# Patient Record
Sex: FEMALE | Race: BLACK OR AFRICAN AMERICAN | Employment: UNEMPLOYED | URBAN - METROPOLITAN AREA
[De-identification: names, ages, dates, MRNs, and addresses within clinical notes are randomized per-mention and may not be internally consistent; named-entity substitution may affect disease eponyms.]

---

## 2023-01-03 ENCOUNTER — HOSPITAL ENCOUNTER (EMERGENCY)
Age: 58
Discharge: HOME | End: 2023-01-03
Attending: EMERGENCY MEDICINE
Payer: MEDICARE

## 2023-01-03 VITALS
OXYGEN SATURATION: 99 % | RESPIRATION RATE: 18 BRPM | TEMPERATURE: 98.6 F | SYSTOLIC BLOOD PRESSURE: 185 MMHG | DIASTOLIC BLOOD PRESSURE: 107 MMHG | HEART RATE: 88 BPM

## 2023-01-03 DIAGNOSIS — M54.31 SCIATICA OF RIGHT SIDE: Primary | ICD-10-CM

## 2023-01-03 PROCEDURE — 6370000000 HC RX 637 (ALT 250 FOR IP): Performed by: EMERGENCY MEDICINE

## 2023-01-03 PROCEDURE — 6360000002 HC RX W HCPCS: Performed by: EMERGENCY MEDICINE

## 2023-01-03 PROCEDURE — 99283 EMERGENCY DEPT VISIT LOW MDM: CPT

## 2023-01-03 RX ORDER — CYCLOBENZAPRINE HCL 10 MG
10 TABLET ORAL ONCE
Status: COMPLETED | OUTPATIENT
Start: 2023-01-03 | End: 2023-01-03

## 2023-01-03 RX ORDER — HYDROCODONE BITARTRATE AND ACETAMINOPHEN 5; 325 MG/1; MG/1
1 TABLET ORAL ONCE
Status: COMPLETED | OUTPATIENT
Start: 2023-01-03 | End: 2023-01-03

## 2023-01-03 RX ORDER — CYCLOBENZAPRINE HCL 10 MG
10 TABLET ORAL 3 TIMES DAILY PRN
Qty: 21 TABLET | Refills: 0 | Status: SHIPPED | OUTPATIENT
Start: 2023-01-03 | End: 2023-01-13

## 2023-01-03 RX ADMIN — HYDROCODONE BITARTRATE AND ACETAMINOPHEN 1 TABLET: 5; 325 TABLET ORAL at 05:20

## 2023-01-03 RX ADMIN — DEXAMETHASONE 10 MG: 4 TABLET ORAL at 05:19

## 2023-01-03 RX ADMIN — CYCLOBENZAPRINE 10 MG: 10 TABLET, FILM COATED ORAL at 05:19

## 2023-01-03 ASSESSMENT — PAIN SCALES - GENERAL
PAINLEVEL_OUTOF10: 8
PAINLEVEL_OUTOF10: 10

## 2023-01-03 ASSESSMENT — ENCOUNTER SYMPTOMS
NAUSEA: 0
CHEST TIGHTNESS: 0
VOMITING: 0
FACIAL SWELLING: 0
PHOTOPHOBIA: 0
EYE PAIN: 0
COLOR CHANGE: 0
TROUBLE SWALLOWING: 0
SHORTNESS OF BREATH: 0
VOICE CHANGE: 0
BACK PAIN: 1
ABDOMINAL PAIN: 0

## 2023-01-03 ASSESSMENT — PAIN DESCRIPTION - DESCRIPTORS: DESCRIPTORS: SHARP;SHOOTING

## 2023-01-03 ASSESSMENT — PAIN DESCRIPTION - PAIN TYPE: TYPE: ACUTE PAIN

## 2023-01-03 ASSESSMENT — PAIN DESCRIPTION - LOCATION: LOCATION: BACK

## 2023-01-03 ASSESSMENT — PAIN - FUNCTIONAL ASSESSMENT: PAIN_FUNCTIONAL_ASSESSMENT: 0-10

## 2023-01-03 NOTE — ED NOTES
Pt arrived to ED with c/o back pain. Pt has hx of sciatica. Pt states it came back approximately a week ago and has worsened.       Walter Bean RN  01/03/23 0258

## 2023-01-03 NOTE — ED PROVIDER NOTES
EMERGENCY DEPARTMENT ENCOUNTER    Pt Name: Brenna Ulloa  MRN: [id number]  [place] 1965  Date of evaluation: 1/3/23  CHIEF COMPLAINT       Chief Complaint   Patient presents with    Back Pain     Sciatica pain/ no relief with home meds     HISTORY OF PRESENT ILLNESS   65-year-old female presenting to the ER complaining of right lower back pain. Patient has a history of sciatica in the past and was operated on for it. Patient states it returned about a week ago and it has progressively gotten worse. Patient denies any loss of control of bowel or urine or any numbness in the pelvic area. The history is provided by the patient. Back Pain  Location:  Lumbar spine  Quality:  Aching  Radiates to:  R thigh  Pain severity:  Moderate  Onset quality:  Gradual  Duration:  1 week  Timing:  Constant  Progression:  Worsening  Chronicity:  New  Associated symptoms: no abdominal pain, no chest pain, no dysuria and no headaches          REVIEW OF SYSTEMS     Review of Systems   Constitutional:  Negative for activity change, appetite change and fatigue. HENT:  Negative for facial swelling, trouble swallowing and voice change. Eyes:  Negative for photophobia and pain. Respiratory:  Negative for chest tightness and shortness of breath. Cardiovascular:  Negative for chest pain and palpitations. Gastrointestinal:  Negative for abdominal pain, nausea and vomiting. Genitourinary:  Negative for dysuria and urgency. Musculoskeletal:  Positive for back pain. Negative for arthralgias. Skin:  Negative for color change and rash. Neurological:  Negative for dizziness, syncope and headaches. Psychiatric/Behavioral:  Negative for behavioral problems and hallucinations.     PASTMEDICAL HISTORY     Past Medical History:   Diagnosis Date    Arthritis      Past Problem List  Patient Active Problem List   Diagnosis Code    Osteoarthritis of left shoulder M19.012    [facility] F31.9     SURGICAL HISTORY Past Surgical History:   Procedure Laterality Date    COLONOSCOPY      REPLACEMENT SHOULDER TOTAL Left 2019    LEFT SHOULDER TOTAL ARTHROPLASTY performed by Lilly Rogers MD at [address] [facility]       Previous Medications    ASPIRIN EC 81 MG EC TABLET    Take 1 tablet by mouth 2 times daily for 14 days    CLONIDINE (CATAPRES) 0.1 MG TABLET    Take 1 tablet by mouth nightly    DIVALPROEX (DEPAKOTE ER) 250 MG EXTENDED RELEASE TABLET    Take 1 tablet by mouth 2 times daily for 14 days    DOCUSATE SODIUM (COLACE) 100 MG CAPSULE    Take 1 capsule by mouth 2 times daily    ESCITALOPRAM (LEXAPRO) 10 MG TABLET    Take 10 mg by mouth daily    MULTIPLE VITAMINS-IRON (MULTI-VITAMIN/IRON) TABS    Take 1 tablet by mouth daily    QUETIAPINE (SEROQUEL) 25 MG TABLET    Take 1 tablet by mouth nightly    TRAZODONE (DESYREL) 50 MG TABLET    Take 1 tablet by mouth nightly as needed for Sleep    VITAMIN D (CHOLECALCIFEROL) 25 MCG (1000 UT) TABS TABLET    Take 50,000 Units by mouth once a week      ALLERGIES     has No Known Allergies. FAMILY HISTORY     She indicated that her mother is alive. She indicated that her father is . She indicated that the status of her brother is unknown. She indicated that the status of her paternal grandmother is unknown. She indicated that the status of her paternal grandfather is unknown. She indicated that the status of her neg hx is unknown.      SOCIAL HISTORY       Social History     Tobacco Use    Smoking status: Former     Types: Cigarettes     Quit date: 2019     Years since quitting: 3.1    Smokeless tobacco: Never   Substance Use Topics    Alcohol use: Yes     Comment: socially    Drug use: Yes     Frequency: 2.0 times per week     Types: Marijuana (Weed)     PHYSICAL EXAM     INITIAL VITALS: BP (!) 185/107 Comment: pt currently bent over the side of the bed  Pulse 88   Temp 98.6 °F (37 °C) (Oral)   Resp 18   SpO2 99%    Physical Exam  Vitals reviewed. Constitutional:       General: She is in acute distress (secondary to pain). Appearance: Normal appearance. She is not ill-appearing or toxic-appearing. HENT:      Head: Normocephalic and atraumatic. Right Ear: External ear normal.      Left Ear: External ear normal.      Nose: No congestion or rhinorrhea. Eyes:      Extraocular Movements: Extraocular movements intact. Pupils: Pupils are equal, round, and reactive to light. Cardiovascular:      Rate and Rhythm: Normal rate and regular rhythm. Pulses: Normal pulses. Heart sounds: Normal heart sounds. Pulmonary:      Effort: Pulmonary effort is normal. No respiratory distress. Breath sounds: Normal breath sounds. No wheezing. Abdominal:      General: Bowel sounds are normal. There is no distension. Palpations: Abdomen is soft. Tenderness: There is no abdominal tenderness. Musculoskeletal:         General: No deformity or signs of injury. Normal range of motion. Cervical back: No rigidity or tenderness. Skin:     General: Skin is warm and dry. Neurological:      Mental Status: She is alert and oriented to person, place, and time. Mental status is at baseline. Psychiatric:         Mood and Affect: Mood normal.         Behavior: Behavior normal.       MEDICAL DECISION MAKING / ED COURSE:   Summary of Patient Presentation: Patient presenting with right lower back pain. 1)  Number and Complexity of Problems Addressed at this Encounter  Problem List This Visit: Sciatica, right lower back pain    Differential Diagnosis: Muscle strain, sciatica          3)  Treatment and Disposition         Patient provided with a dose of Norco, Flexeril as well as Decadron in the ER. Patient provided with a prescription for Flexeril for outpatient use.   Patient instructed to follow-up with the primary care physician within 2 days and to return to the ER immediately if symptoms worsen or change. Patient instructed to follow-up with the surgeon who operated on her back before. Patient understands and agrees with the plan. CRITICAL CARE:       PROCEDURES:    Procedures      DATA FOR LAB AND RADIOLOGY TESTS ORDERED BELOW ARE REVIEWED BY THE ED CLINICIAN:    RADIOLOGY: All x-rays, CT, MRI, and formal ultrasound images (except ED bedside ultrasound) are read by the radiologist, see reports below, unless otherwise noted in MDM or here. Reports below are reviewed by myself. No orders to display       LABS: Lab orders shown below, the results are reviewed by myself, and all abnormals are listed below. Labs Reviewed - No data to display    Vitals Reviewed:    Vitals:    01/03/23 0537   BP: (!) 185/107   Pulse: 88   Resp: 18   Temp: 98.6 °F (37 °C)   TempSrc: Oral   SpO2: 99%     MEDICATIONS GIVEN TO PATIENT THIS ENCOUNTER:  Orders Placed This Encounter   Medications    HYDROcodone-acetaminophen (NORCO) 5-325 MG per tablet 1 tablet    cyclobenzaprine (FLEXERIL) tablet 10 mg    dexamethasone (DECADRON) tablet 10 mg    cyclobenzaprine (FLEXERIL) 10 MG tablet     Sig: Take 1 tablet by mouth 3 times daily as needed for Muscle spasms     Dispense:  21 tablet     Refill:  0     DISCHARGE PRESCRIPTIONS:  New Prescriptions    CYCLOBENZAPRINE (FLEXERIL) 10 MG TABLET    Take 1 tablet by mouth 3 times daily as needed for Muscle spasms     PHYSICIAN CONSULTS ORDERED THIS ENCOUNTER:  None  FINAL IMPRESSION      1.  Sciatica of right side          DISPOSITION/PLAN   DISPOSITION Decision To Discharge 01/03/2023 05:38:08 AM      OUTPATIENT FOLLOW UP THE PATIENT:  Didier Couch, [address]  [phone]  [phone]    Schedule an appointment as soon as possible for a visit in 2 days      Surgeon    Schedule an appointment as soon as possible for a visit in 2 days      [facility] ED  [address] [facility] [address] [phone]  Go to   As needed, If symptoms worsen    DO Liu Lara DO  01/03/23 7708

## 2024-07-04 ENCOUNTER — APPOINTMENT (OUTPATIENT)
Dept: CT IMAGING | Age: 59
End: 2024-07-04
Payer: COMMERCIAL

## 2024-07-04 ENCOUNTER — APPOINTMENT (OUTPATIENT)
Dept: GENERAL RADIOLOGY | Age: 59
End: 2024-07-04
Payer: COMMERCIAL

## 2024-07-04 ENCOUNTER — HOSPITAL ENCOUNTER (EMERGENCY)
Age: 59
Discharge: ANOTHER HOSPITAL | End: 2024-07-05
Attending: EMERGENCY MEDICINE
Payer: COMMERCIAL

## 2024-07-04 DIAGNOSIS — J98.2 PNEUMOMEDIASTINUM (HCC): ICD-10-CM

## 2024-07-04 DIAGNOSIS — S22.32XA CLOSED FRACTURE OF ONE RIB OF LEFT SIDE, INITIAL ENCOUNTER: Primary | ICD-10-CM

## 2024-07-04 DIAGNOSIS — S27.0XXA TRAUMATIC PNEUMOTHORAX, INITIAL ENCOUNTER: ICD-10-CM

## 2024-07-04 LAB
ANION GAP SERPL CALCULATED.3IONS-SCNC: 16 MMOL/L (ref 9–17)
BASOPHILS # BLD: 0.06 K/UL (ref 0–0.2)
BASOPHILS NFR BLD: 1 % (ref 0–2)
BUN SERPL-MCNC: 12 MG/DL (ref 6–20)
BUN/CREAT SERPL: 15 (ref 9–20)
CALCIUM SERPL-MCNC: 9.7 MG/DL (ref 8.6–10.4)
CHLORIDE SERPL-SCNC: 99 MMOL/L (ref 98–107)
CO2 SERPL-SCNC: 22 MMOL/L (ref 20–31)
CREAT SERPL-MCNC: 0.8 MG/DL (ref 0.5–0.9)
EOSINOPHIL # BLD: 0.04 K/UL (ref 0–0.44)
EOSINOPHILS RELATIVE PERCENT: 0 % (ref 1–4)
ERYTHROCYTE [DISTWIDTH] IN BLOOD BY AUTOMATED COUNT: 14.9 % (ref 11.8–14.4)
GFR, ESTIMATED: 85 ML/MIN/1.73M2
GLUCOSE SERPL-MCNC: 130 MG/DL (ref 70–99)
HCT VFR BLD AUTO: 40 % (ref 36.3–47.1)
HGB BLD-MCNC: 13.1 G/DL (ref 11.9–15.1)
IMM GRANULOCYTES # BLD AUTO: 0.04 K/UL (ref 0–0.3)
IMM GRANULOCYTES NFR BLD: 0 %
INR PPP: 0.9
LYMPHOCYTES NFR BLD: 2.79 K/UL (ref 1.1–3.7)
LYMPHOCYTES RELATIVE PERCENT: 24 % (ref 24–43)
MCH RBC QN AUTO: 28.5 PG (ref 25.2–33.5)
MCHC RBC AUTO-ENTMCNC: 32.8 G/DL (ref 28.4–34.8)
MCV RBC AUTO: 87 FL (ref 82.6–102.9)
MONOCYTES NFR BLD: 0.31 K/UL (ref 0.1–1.2)
MONOCYTES NFR BLD: 3 % (ref 3–12)
NEUTROPHILS NFR BLD: 72 % (ref 36–65)
NEUTS SEG NFR BLD: 8.42 K/UL (ref 1.5–8.1)
NRBC BLD-RTO: 0 PER 100 WBC
PARTIAL THROMBOPLASTIN TIME: 32.5 SEC (ref 23.9–33.8)
PLATELET # BLD AUTO: 351 K/UL (ref 138–453)
PMV BLD AUTO: 8.8 FL (ref 8.1–13.5)
POTASSIUM SERPL-SCNC: 3.6 MMOL/L (ref 3.7–5.3)
PROTHROMBIN TIME: 12.6 SEC (ref 11.5–14.2)
RBC # BLD AUTO: 4.6 M/UL (ref 3.95–5.11)
RBC # BLD: ABNORMAL 10*6/UL
SODIUM SERPL-SCNC: 137 MMOL/L (ref 135–144)
WBC OTHER # BLD: 11.7 K/UL (ref 3.5–11.3)

## 2024-07-04 PROCEDURE — 99285 EMERGENCY DEPT VISIT HI MDM: CPT

## 2024-07-04 PROCEDURE — 72131 CT LUMBAR SPINE W/O DYE: CPT

## 2024-07-04 PROCEDURE — 85730 THROMBOPLASTIN TIME PARTIAL: CPT

## 2024-07-04 PROCEDURE — 85025 COMPLETE CBC W/AUTO DIFF WBC: CPT

## 2024-07-04 PROCEDURE — 80048 BASIC METABOLIC PNL TOTAL CA: CPT

## 2024-07-04 PROCEDURE — 6360000002 HC RX W HCPCS: Performed by: EMERGENCY MEDICINE

## 2024-07-04 PROCEDURE — 2580000003 HC RX 258

## 2024-07-04 PROCEDURE — 71260 CT THORAX DX C+: CPT

## 2024-07-04 PROCEDURE — 96374 THER/PROPH/DIAG INJ IV PUSH: CPT

## 2024-07-04 PROCEDURE — 36415 COLL VENOUS BLD VENIPUNCTURE: CPT

## 2024-07-04 PROCEDURE — 85610 PROTHROMBIN TIME: CPT

## 2024-07-04 PROCEDURE — 6360000004 HC RX CONTRAST MEDICATION

## 2024-07-04 PROCEDURE — 70491 CT SOFT TISSUE NECK W/DYE: CPT

## 2024-07-04 PROCEDURE — 96372 THER/PROPH/DIAG INJ SC/IM: CPT

## 2024-07-04 PROCEDURE — 72128 CT CHEST SPINE W/O DYE: CPT

## 2024-07-04 PROCEDURE — 74176 CT ABD & PELVIS W/O CONTRAST: CPT

## 2024-07-04 PROCEDURE — 6360000002 HC RX W HCPCS: Performed by: STUDENT IN AN ORGANIZED HEALTH CARE EDUCATION/TRAINING PROGRAM

## 2024-07-04 PROCEDURE — 6370000000 HC RX 637 (ALT 250 FOR IP): Performed by: STUDENT IN AN ORGANIZED HEALTH CARE EDUCATION/TRAINING PROGRAM

## 2024-07-04 PROCEDURE — 71101 X-RAY EXAM UNILAT RIBS/CHEST: CPT

## 2024-07-04 RX ORDER — MORPHINE SULFATE 2 MG/ML
2 INJECTION, SOLUTION INTRAMUSCULAR; INTRAVENOUS ONCE
Status: COMPLETED | OUTPATIENT
Start: 2024-07-04 | End: 2024-07-04

## 2024-07-04 RX ORDER — 0.9 % SODIUM CHLORIDE 0.9 %
80 INTRAVENOUS SOLUTION INTRAVENOUS ONCE
Status: COMPLETED | OUTPATIENT
Start: 2024-07-04 | End: 2024-07-04

## 2024-07-04 RX ORDER — KETOROLAC TROMETHAMINE 15 MG/ML
15 INJECTION, SOLUTION INTRAMUSCULAR; INTRAVENOUS ONCE
Status: COMPLETED | OUTPATIENT
Start: 2024-07-04 | End: 2024-07-04

## 2024-07-04 RX ORDER — HYDROCODONE BITARTRATE AND ACETAMINOPHEN 5; 325 MG/1; MG/1
2 TABLET ORAL ONCE
Status: DISCONTINUED | OUTPATIENT
Start: 2024-07-04 | End: 2024-07-04

## 2024-07-04 RX ORDER — LIDOCAINE 4 G/G
1 PATCH TOPICAL ONCE
Status: DISCONTINUED | OUTPATIENT
Start: 2024-07-04 | End: 2024-07-05 | Stop reason: HOSPADM

## 2024-07-04 RX ORDER — HYDROCODONE BITARTRATE AND ACETAMINOPHEN 5; 325 MG/1; MG/1
1 TABLET ORAL ONCE
Status: DISCONTINUED | OUTPATIENT
Start: 2024-07-04 | End: 2024-07-04

## 2024-07-04 RX ORDER — SODIUM CHLORIDE 0.9 % (FLUSH) 0.9 %
10 SYRINGE (ML) INJECTION PRN
Status: DISCONTINUED | OUTPATIENT
Start: 2024-07-04 | End: 2024-07-05 | Stop reason: HOSPADM

## 2024-07-04 RX ORDER — ONDANSETRON 2 MG/ML
4 INJECTION INTRAMUSCULAR; INTRAVENOUS ONCE
Status: COMPLETED | OUTPATIENT
Start: 2024-07-04 | End: 2024-07-04

## 2024-07-04 RX ADMIN — MORPHINE SULFATE 2 MG: 2 INJECTION, SOLUTION INTRAMUSCULAR; INTRAVENOUS at 19:23

## 2024-07-04 RX ADMIN — ONDANSETRON 4 MG: 2 INJECTION INTRAMUSCULAR; INTRAVENOUS at 20:28

## 2024-07-04 RX ADMIN — SODIUM CHLORIDE, PRESERVATIVE FREE 10 ML: 5 INJECTION INTRAVENOUS at 21:39

## 2024-07-04 RX ADMIN — SODIUM CHLORIDE 80 ML: 0.9 INJECTION, SOLUTION INTRAVENOUS at 21:39

## 2024-07-04 RX ADMIN — IOPAMIDOL 75 ML: 755 INJECTION, SOLUTION INTRAVENOUS at 21:39

## 2024-07-04 RX ADMIN — KETOROLAC TROMETHAMINE 15 MG: 15 INJECTION, SOLUTION INTRAMUSCULAR; INTRAVENOUS at 22:49

## 2024-07-04 RX ADMIN — HYDROMORPHONE HYDROCHLORIDE 0.5 MG: 1 INJECTION, SOLUTION INTRAMUSCULAR; INTRAVENOUS; SUBCUTANEOUS at 20:30

## 2024-07-04 ASSESSMENT — PAIN DESCRIPTION - ORIENTATION: ORIENTATION: LEFT

## 2024-07-04 ASSESSMENT — PAIN DESCRIPTION - DESCRIPTORS: DESCRIPTORS: ACHING

## 2024-07-04 ASSESSMENT — PAIN DESCRIPTION - LOCATION: LOCATION: RIB CAGE

## 2024-07-04 ASSESSMENT — ENCOUNTER SYMPTOMS
NAUSEA: 0
ABDOMINAL PAIN: 0
CONSTIPATION: 0
WHEEZING: 0
VOMITING: 0
BACK PAIN: 1
DIARRHEA: 0
SHORTNESS OF BREATH: 0

## 2024-07-04 ASSESSMENT — PAIN DESCRIPTION - FREQUENCY: FREQUENCY: CONTINUOUS

## 2024-07-04 ASSESSMENT — PAIN SCALES - GENERAL
PAINLEVEL_OUTOF10: 10
PAINLEVEL_OUTOF10: 10
PAINLEVEL_OUTOF10: 5
PAINLEVEL_OUTOF10: 10

## 2024-07-04 ASSESSMENT — PAIN - FUNCTIONAL ASSESSMENT: PAIN_FUNCTIONAL_ASSESSMENT: 0-10

## 2024-07-04 NOTE — ED PROVIDER NOTES
EMERGENCY DEPARTMENT ENCOUNTER   ATTENDING ATTESTATION     Pt Name: Otilia Solo  MRN: [id number]  Birthdate 1965  Date of evaluation: 7/4/24       Otilia Solo is a 58 y.o. female who presents with Rib Pain (Left fell 15 min ago)      MDM:   The patient is a 58-year-old female who presented to the emergency department secondary to pain.  Patient was undergone section in a car subsequently fell landing on her left side of her chest.  XR ordered a chest x-ray with left rib series patient received Norco, patient will be reevaluated.  Fracture of the left lateral eighth rib no pneumothorax however subcutaneous air in the neck.  Given his orders for CT chest and neck with contrast.  Orders for labs.  Imaging pending at this time, patient will be endorsed to Dr. Dominguez please see his note for final ED disposition      Vitals:   Vitals:    07/04/24 1816 07/04/24 1818 07/04/24 2052   BP:  (!) 170/108 (!) 153/74   Pulse: 60  60   Resp: 24  16   Temp: 98.3 °F (36.8 °C)     TempSrc: Oral     SpO2: 99%  94%   Weight: 78 kg (172 lb)     Height: 1.6 m (5' 3\")           I personally saw and examined the patient. I have reviewed and agree with the resident's findings, including all diagnostic interpretations and treatment plan as written. I was present for the key portions of any procedures performed and the inclusive time noted for any critical care statement.    April Haddad MD  Attending Emergency Physician            April Haddad MD  07/04/24 7520

## 2024-07-04 NOTE — ED PROVIDER NOTES
[facility] ED  EMERGENCY DEPARTMENT ENCOUNTER      Pt Name: Otilia Solo  MRN: [id number]  Birthdate 1965  Date of evaluation: 7/4/2024  Provider: Che Gonzales MD    CHIEF COMPLAINT       Chief Complaint   Patient presents with    Rib Pain     Left fell 15 min ago         HISTORY OF PRESENT ILLNESS   (Location/Symptom, Timing/Onset, Context/Setting, Quality, Duration, Modifying Factors, Severity)  Note limiting factors.   Otilia Solo is a 58 y.o. female who presents to the emergency department with left rib pain s/p fall an hour before presentation    Patient with past medical history hypertension, bipolar 1 disorder presenting with left-sided rib pain status post fall an hour ago.  Patient accompanied with partner stating that she fell and hit her left lateral chest wall against the bathtub.  Patient appears in distress and endorsing severe pain, causing difficulty taking deep breaths.  Patient denies midsternal chest pain, nausea, vomiting, dizziness, headache.  Vitals show elevated blood pressure 170/108 and tachypnea.           Nursing Notes were reviewed.    REVIEW OF SYSTEMS    (2-9 systems for level 4, 10 or more for level 5)     Review of Systems   Constitutional:  Negative for fatigue.   Eyes:  Negative for visual disturbance.   Respiratory:  Negative for shortness of breath and wheezing.    Cardiovascular:  Positive for chest pain (left side). Negative for palpitations.   Gastrointestinal:  Negative for abdominal pain, constipation, diarrhea, nausea and vomiting.   Endocrine: Negative.    Musculoskeletal:  Positive for back pain (left thoracic).   Skin:  Negative for rash.   Neurological:  Negative for dizziness, light-headedness and headaches.   Psychiatric/Behavioral: Negative.         Except as noted above the remainder of the review of systems was reviewed and negative.       PAST MEDICAL HISTORY     Past Medical History:   Diagnosis Date    Arthritis          SURGICAL HISTORY    probability of clinically significant/life threatening deterioration in the patient's condition which required my urgent intervention.      CONSULTS:  None    PROCEDURES:  Unless otherwise noted below, none     Procedures        FINAL IMPRESSION      1. Closed fracture of one rib of left side, initial encounter    2. Subcutaneous emphysema, initial encounter ([facility])          DISPOSITION/PLAN   DISPOSITION  07/04/2024 07:51:16 PM      PATIENT REFERRED TO:  No follow-up provider specified.    DISCHARGE MEDICATIONS:  New Prescriptions    No medications on file     Controlled Substances Monitoring:          No data to display                (Please note that portions of this note were completed with a voice recognition program.  Efforts were made to edit the dictations but occasionally words are mis-transcribed.)    Che Gonzales MD (electronically signed)  Family Medicine Resident, PGY-3

## 2024-07-05 ENCOUNTER — APPOINTMENT (OUTPATIENT)
Dept: GENERAL RADIOLOGY | Age: 59
End: 2024-07-05
Payer: COMMERCIAL

## 2024-07-05 VITALS
SYSTOLIC BLOOD PRESSURE: 102 MMHG | HEIGHT: 63 IN | TEMPERATURE: 98.3 F | RESPIRATION RATE: 16 BRPM | DIASTOLIC BLOOD PRESSURE: 59 MMHG | WEIGHT: 172 LBS | HEART RATE: 60 BPM | OXYGEN SATURATION: 94 % | BODY MASS INDEX: 30.48 KG/M2

## 2024-07-05 NOTE — ED NOTES
Report given to [facility] EMTs present to transport patient to [facility] ED. Patient in stable condition on transport to [facility].

## 2024-07-05 NOTE — ED NOTES
RN attempted to do incentive spirometer with patient. Patient was able to get to 1500 x2 with much discomfort and pain. Dr De Dios updated

## 2024-07-05 NOTE — ED PROVIDER NOTES
[facility] ED  Emergency Department  Emergency Medicine     Care of Otilia Solo was assumed from previous provider and is being seen for Rib Pain (Left fell 15 min ago)  .  The patient's initial evaluation and plan have been discussed with the prior provider who initially evaluated the patient.     EMERGENCY DEPARTMENT COURSE / MEDICAL DECISION MAKING:       MEDICATIONS GIVEN:  Orders Placed This Encounter   Medications    DISCONTD: HYDROcodone-acetaminophen (NORCO) 5-325 MG per tablet 1 tablet    DISCONTD: HYDROcodone-acetaminophen (NORCO) 5-325 MG per tablet 2 tablet    morphine injection 2 mg    HYDROmorphone (DILAUDID) injection 0.5 mg    ondansetron (ZOFRAN) injection 4 mg    sodium chloride 0.9 % bolus 80 mL    sodium chloride flush 0.9 % injection 10 mL    iopamidol (ISOVUE-370) 76 % injection 75 mL    ketorolac (TORADOL) injection 15 mg    lidocaine 4 % external patch 1 patch       LABS / RADIOLOGY:     Labs Reviewed   BASIC METABOLIC PANEL - Abnormal; Notable for the following components:       Result Value    Potassium 3.6 (*)     Glucose 130 (*)     All other components within normal limits   CBC WITH AUTO DIFFERENTIAL - Abnormal; Notable for the following components:    WBC 11.7 (*)     RDW 14.9 (*)     Neutrophils % 72 (*)     Eosinophils % 0 (*)     Neutrophils Absolute 8.42 (*)     All other components within normal limits   PROTIME-INR   APTT       CT CHEST W CONTRAST    Result Date: 7/4/2024  EXAMINATION: CT OF THE CHEST WITH CONTRAST 7/4/2024 8:23 pm TECHNIQUE: CT of the chest was performed with the administration of intravenous contrast. Multiplanar reformatted images are provided for review. Automated exposure control, iterative reconstruction, and/or weight based adjustment of the mA/kV was utilized to reduce the radiation dose to as low as reasonably achievable. COMPARISON: CT scan of neck, with contrast, performed on 07/04/2024 at 9:35 p.m. HISTORY: ORDERING SYSTEM PROVIDED

## 2024-07-05 NOTE — ED NOTES
RN called to update [facility] charge [facility]iki about delay in transportation-- [facility] ETA 0400.

## 2025-04-29 ENCOUNTER — HOSPITAL ENCOUNTER (EMERGENCY)
Age: 60
Discharge: HOME | End: 2025-04-29
Attending: EMERGENCY MEDICINE
Payer: COMMERCIAL

## 2025-04-29 VITALS
TEMPERATURE: 97.9 F | DIASTOLIC BLOOD PRESSURE: 89 MMHG | OXYGEN SATURATION: 98 % | RESPIRATION RATE: 16 BRPM | SYSTOLIC BLOOD PRESSURE: 134 MMHG | HEART RATE: 61 BPM

## 2025-04-29 DIAGNOSIS — S16.1XXA STRAIN OF NECK MUSCLE, INITIAL ENCOUNTER: Primary | ICD-10-CM

## 2025-04-29 PROCEDURE — 96372 THER/PROPH/DIAG INJ SC/IM: CPT | Performed by: EMERGENCY MEDICINE

## 2025-04-29 PROCEDURE — 99284 EMERGENCY DEPT VISIT MOD MDM: CPT | Performed by: EMERGENCY MEDICINE

## 2025-04-29 PROCEDURE — 6360000002 HC RX W HCPCS

## 2025-04-29 PROCEDURE — 6370000000 HC RX 637 (ALT 250 FOR IP)

## 2025-04-29 RX ORDER — ACETAMINOPHEN 500 MG
500 TABLET ORAL 4 TIMES DAILY PRN
Qty: 40 TABLET | Refills: 0 | Status: SHIPPED | OUTPATIENT
Start: 2025-04-29 | End: 2025-05-09

## 2025-04-29 RX ORDER — IBUPROFEN 600 MG/1
600 TABLET, FILM COATED ORAL 3 TIMES DAILY PRN
Qty: 30 TABLET | Refills: 0 | Status: SHIPPED | OUTPATIENT
Start: 2025-04-29

## 2025-04-29 RX ORDER — KETOROLAC TROMETHAMINE 15 MG/ML
15 INJECTION, SOLUTION INTRAMUSCULAR; INTRAVENOUS ONCE
Status: COMPLETED | OUTPATIENT
Start: 2025-04-29 | End: 2025-04-29

## 2025-04-29 RX ORDER — ACETAMINOPHEN 500 MG
1000 TABLET ORAL ONCE
Status: COMPLETED | OUTPATIENT
Start: 2025-04-29 | End: 2025-04-29

## 2025-04-29 RX ORDER — CYCLOBENZAPRINE HCL 5 MG
5 TABLET ORAL 2 TIMES DAILY PRN
Qty: 10 TABLET | Refills: 0 | Status: SHIPPED | OUTPATIENT
Start: 2025-04-29 | End: 2025-05-09

## 2025-04-29 RX ORDER — CAPSAICIN 0.025 %
CREAM (GRAM) TOPICAL
Qty: 25 G | Refills: 1 | Status: SHIPPED | OUTPATIENT
Start: 2025-04-29 | End: 2025-05-29

## 2025-04-29 RX ORDER — CAPSAICIN 0.025 %
CREAM (GRAM) TOPICAL 2 TIMES DAILY
Status: DISCONTINUED | OUTPATIENT
Start: 2025-04-29 | End: 2025-04-29 | Stop reason: HOSPADM

## 2025-04-29 RX ADMIN — KETOROLAC TROMETHAMINE 15 MG: 15 INJECTION, SOLUTION INTRAMUSCULAR; INTRAVENOUS at 06:38

## 2025-04-29 RX ADMIN — ACETAMINOPHEN 1000 MG: 500 TABLET ORAL at 06:37

## 2025-04-29 RX ADMIN — CAPSAICIN: 0.25 CREAM TOPICAL at 06:38

## 2025-04-29 NOTE — ED NOTES
Pt presents to the ED via triage with c/o neck pain  Pt reports ongoing neck pain for 3 weeks  Pt denies radiating pain  Pt admits use of lidocaine patch and heat at home with relief  Pt denies injury to area  Pt admits hx of lower back surgery  Denies any recent heavy lifting or moving  VSS  Lidocaine in place

## 2025-04-29 NOTE — DISCHARGE INSTRUCTIONS
-You were seen and evaluated by emergency medicine physicians at [facility].    -Please follow-up with your primary care physician and/or with the referrals to specialist.    -You were diagnosed with: Strain of neck muscle    - Improvement with your neck pain following intramuscular and oral medications.  Prescriptions of multimodal pain therapy have been sent to your pharmacy.  Please take the prescribed.  Please follow with your PCP.    -Please return to the Emergency Department if you are experiencing the following symptoms acutely: Headache, fever, chills, nausea, vomiting, chest pain, shortness of breath, abdominal pain, change with urination, change with bowel movements, change in your skin/hair/nail, weakness, fatigue, altered mental status and/or any change from baseline health.    -Thank you for coming to [facility].

## 2025-04-29 NOTE — ED PROVIDER NOTES
[facility]     Emergency Department     Faculty Attestation    I performed a history and physical examination of the patient and discussed management with all residents. I reviewed the resident’s note and agree with the documented findings and plan of care. Any areas of disagreement are noted on the chart. I was personally present for the key portions of any procedures. I have documented in the chart those procedures where I was not present during the key portions. I have reviewed the emergency nurses triage note. I agree with the chief complaint, past medical history, past surgical history, allergies, medications, social and family history as documented unless otherwise noted below.    For Physician Assistant/ Nurse Practitioner cases/documentation I have personally evaluated this patient and have completed at least one if not all key elements of the E/M (history, physical exam, and MDM). Additional findings are as noted.      Primary Care Physician:  No primary care provider on file.    CHIEF COMPLAINT       Chief Complaint   Patient presents with    Neck Pain       RECENT VITALS:   Temp: 97.9 °F (36.6 °C),  Pulse: 61, Respirations: 16, BP: 134/89    LABS:  Labs Reviewed - No data to display    Radiology  No orders to display         Attending Physician Additional  Notes    The patient is a 59-year-old female who presents for evaluation of neck pain that has been ongoing for the past 3 weeks.  She denies any trauma.  She denies any previous injury or surgery to her neck.  The patient took Tylenol and applied a Lidoderm patch without much improvement.  She drove herself to the emergency department.  The patient denies fever, chills, headache, vision changes, chest pain, shortness of breath, abdominal pain, nausea, vomiting, recent injury or illness.    Heart regular rate and rhythm.  Lungs clear to auscultation.  Abdomen soft and nontender.  No midline spinal tenderness to palpation, 
[facility]  FACULTY HANDOFF       Handoff taken on the following patient from prior Attending Physician:  Pt Name: Otilia Solo  PCP:  No primary care provider on file.    Attestation  I was available and discussed any additional care issues that arose and coordinated the management plans with all resident(s) caring for the patient during my duty period. Any areas of disagreement with resident's documentation of care or procedures are noted on the chart. I was personally present for the key portions of any/all procedures during my duty period. I have documented in the chart those procedures where I was not present during the key portions.           Neto Sheriff MD  04/29/25 4806    
500 MG tablet Take 1 tablet by mouth 4 times daily as needed for Pain 4/29/25 5/9/25 Yes Adonis Casanova DO   capsaicin (ZOSTRIX) 0.025 % cream Apply topically 2 times daily. 4/29/25 5/29/25 Yes dAonis Casanova DO   cyclobenzaprine (FLEXERIL) 5 MG tablet Take 1 tablet by mouth 2 times daily as needed for Muscle spasms 4/29/25 5/9/25 Yes Adonis Casanova DO   ibuprofen (ADVIL;MOTRIN) 600 MG tablet Take 1 tablet by mouth 3 times daily as needed for Pain 4/29/25  Yes Adonis Casanova DO   divalproex (DEPAKOTE ER) 250 MG extended release tablet Take 1 tablet by mouth 2 times daily for 14 days 2/12/20 2/26/20  Coral Schmidt APRN - CNP   traZODone (DESYREL) 50 MG tablet Take 1 tablet by mouth nightly as needed for Sleep 2/12/20   Coral Schmidt APRN - CNP   cloNIDine (CATAPRES) 0.1 MG tablet Take 1 tablet by mouth nightly 2/12/20   Coral Schmidt APRN - CNP   QUEtiapine (SEROQUEL) 25 MG tablet Take 1 tablet by mouth nightly 2/12/20   Coral Schmidt APRN - CNP   escitalopram (LEXAPRO) 10 MG tablet Take 10 mg by mouth daily    Yared Skinner MD   docusate sodium (COLACE) 100 MG capsule Take 1 capsule by mouth 2 times daily 11/20/19   Zachary Gonzalez MD   aspirin EC 81 MG EC tablet Take 1 tablet by mouth 2 times daily for 14 days 11/20/19 12/4/19  Zachary Gonzalez MD   Multiple Vitamins-Iron (MULTI-VITAMIN/IRON) TABS Take 1 tablet by mouth daily    Yared Skinner MD   Vitamin D (CHOLECALCIFEROL) 25 MCG (1000 UT) TABS tablet Take 50,000 Units by mouth once a week     Yared Skinner MD         REVIEW OF SYSTEMS       Review of Systems See HPI    PHYSICAL EXAM      INITIAL VITALS:   /89   Pulse 61   Temp 97.9 °F (36.6 °C)   Resp 16   SpO2 98%     Physical Exam  GCS 15, A and O x 4, VSS.  Afebrile.  No acute distress.  Nontoxic-appearing.  Resting comfortably in bed.  Cranials grossly intact.  Head atraumatic.  No midline cervical spine TTP or step-off.  Patient has bilateral